# Patient Record
Sex: FEMALE | Race: BLACK OR AFRICAN AMERICAN | NOT HISPANIC OR LATINO | Employment: OTHER | URBAN - METROPOLITAN AREA
[De-identification: names, ages, dates, MRNs, and addresses within clinical notes are randomized per-mention and may not be internally consistent; named-entity substitution may affect disease eponyms.]

---

## 2017-01-11 ENCOUNTER — OFFICE VISIT (OUTPATIENT)
Dept: HEMATOLOGY/ONCOLOGY | Facility: CLINIC | Age: 72
End: 2017-01-11
Payer: MEDICARE

## 2017-01-11 VITALS
HEART RATE: 71 BPM | DIASTOLIC BLOOD PRESSURE: 72 MMHG | BODY MASS INDEX: 28.87 KG/M2 | HEIGHT: 65 IN | TEMPERATURE: 99 F | OXYGEN SATURATION: 98 % | SYSTOLIC BLOOD PRESSURE: 122 MMHG | WEIGHT: 173.31 LBS

## 2017-01-11 DIAGNOSIS — C50.912 MALIGNANT NEOPLASM OF LEFT FEMALE BREAST, UNSPECIFIED SITE OF BREAST: Primary | ICD-10-CM

## 2017-01-11 DIAGNOSIS — C78.00 MALIGNANT NEOPLASM METASTATIC TO LUNG, UNSPECIFIED LATERALITY: ICD-10-CM

## 2017-01-11 DIAGNOSIS — C77.1 MALIGNANT NEOPLASM METASTATIC TO INTRATHORACIC LYMPH NODE: ICD-10-CM

## 2017-01-11 DIAGNOSIS — C79.31 SECONDARY CANCER OF BRAIN: ICD-10-CM

## 2017-01-11 PROCEDURE — 1160F RVW MEDS BY RX/DR IN RCRD: CPT | Mod: S$GLB,,, | Performed by: INTERNAL MEDICINE

## 2017-01-11 PROCEDURE — 3078F DIAST BP <80 MM HG: CPT | Mod: S$GLB,,, | Performed by: INTERNAL MEDICINE

## 2017-01-11 PROCEDURE — 1159F MED LIST DOCD IN RCRD: CPT | Mod: S$GLB,,, | Performed by: INTERNAL MEDICINE

## 2017-01-11 PROCEDURE — 3074F SYST BP LT 130 MM HG: CPT | Mod: S$GLB,,, | Performed by: INTERNAL MEDICINE

## 2017-01-11 PROCEDURE — 99999 PR PBB SHADOW E&M-EST. PATIENT-LVL III: CPT | Mod: PBBFAC,,, | Performed by: INTERNAL MEDICINE

## 2017-01-11 PROCEDURE — 99499 UNLISTED E&M SERVICE: CPT | Mod: S$GLB,,, | Performed by: INTERNAL MEDICINE

## 2017-01-11 PROCEDURE — 1157F ADVNC CARE PLAN IN RCRD: CPT | Mod: S$GLB,,, | Performed by: INTERNAL MEDICINE

## 2017-01-11 PROCEDURE — 99214 OFFICE O/P EST MOD 30 MIN: CPT | Mod: S$GLB,,, | Performed by: INTERNAL MEDICINE

## 2017-01-11 PROCEDURE — 1125F AMNT PAIN NOTED PAIN PRSNT: CPT | Mod: S$GLB,,, | Performed by: INTERNAL MEDICINE

## 2017-01-11 RX ORDER — HYDROCODONE BITARTRATE AND ACETAMINOPHEN 5; 325 MG/1; MG/1
1 TABLET ORAL EVERY 4 HOURS PRN
Qty: 60 TABLET | Refills: 0 | Status: SHIPPED | OUTPATIENT
Start: 2017-01-11

## 2017-01-11 NOTE — PROGRESS NOTES
Reason for visit: Left breast infiltrating ductal carcinoma   Date of Diagnosis: 2003 and 2014  Date of diagnosis of metastatic disease from left breast carcinoma which was initially diagnosed and treated in 2014: Sep 16, 2016    HPI:   The patient is a 71-year-old  female who presents to the hematology oncology clinic to discuss further evaluation and management recommendations for history of left breast infiltrating ductal carcinoma [T1C] [ER negative/MN negative/HER-2 negative] now metastatic disease to bilateral lungs and brain.   Her malignancy was treated with left mastectomy.  Axillary lymph node evaluation was not done as the patient has had a prior left axillary lymph node dissection because of a prior malignancy in the same breast in 2003. The prior malignancy was a stage II infiltrating ductal carcinoma of the left breast and she also completed adjuvant endocrine therapy for this in 2008.  She declined adjuvant chemotherapy at that time. This most recent malignancy was felt to be a new primary as it was located in a different quadrant.  Her left breast infiltrating ductal carcinoma was diagnosed and treated in 2003/2004 with lumpectomy with left axillary lymph node dissection, adjuvant radiation therapy and adjuvant Femara.  She reports that she completed her adjuvant endocrine therapy in 2008.  The patient was previously followed in the outpatient hematology oncology clinic by Dr. Elke Nguyen and subsequently switched her care to me.  The patient refused adjuvant chemotherapy for her triple negative left breast carcinoma in 2014.  I have reviewed all of the patient's clinical history available in the medical record and have utilized this in my evaluation and management recommendations today. She has recovered well from her recent craniotomy. She has completed dexamethasone taper. She completed whole brain XRT on 1/5/17.   She denies any fevers, chills or night sweats.  She reports  loss of appetite/loss of taste. She denies unintentional weight loss.  She denies any melena, hematochezia, hematemesis, hemoptysis or hematuria.  She denies any abdominal pain.  She denies any bowel or urinary complaints.  She reports generalized weakness and fatigue.    PAST MEDICAL HISTORY:   1.  Infiltrating ductal carcinoma of the left breast [T1, N1, M0] in 2003/2004  2.  Hypertension  3.  Depression/anxiety  4.  Chronic hepatitis C s/p treatment with harvoni  5.  Chronic insomnia  6.  Coronary artery disease  7.  Osteopenia  8.  GERD    SURGICAL HISTORY:   1.  Hysterectomy  2.  Bilateral carpal tunnel release  3.  Left total mastectomy and left breast reconstruction with latissimus dorsi flap and tissue expander in oct 2014. She then had complete reconstruction done in March 2015.  4.  Right frontal craniotomy for resection of tumor on 9/7/16  5. Mediport placement    FAMILY HISTORY: She denies any immediate family members with cancer or bleeding/clotting disorders.    SOCIAL HISTORY: She has never smoked cigarettes.  She does not drink alcohol.  She has never used any recreational drugs.  She used to work as a  before she retired.  She is  and lives in [place].  She has one daughter living.    ALLERGIES: Reviewed on medication card.    MEDICATIONS: [Medcard has been reviewed and/or reconciled.]    REVIEW OF SYSTEMS:   GENERAL: [No fevers, chills or sweats. Reports generalized fatigue. Denies weight loss. Reports loss of appetite.]  HEENT: [No blurred vision, tinnitus, nasal discharge, sorethroat or dysphagia.]  HEART: [No chest pain, palpitations or shortness of breath.]    LUNGS: [No cough, hemoptysis or breathing problems.]  ABDOMEN: [No abdominal pain. Reports nausea and vomiting. Denies diarrhea, constipation or melena.]  GENITOURINARY: [No dysuria, bleeding or malodorous discharge.]  NEURO: [No headache, dizziness or vertigo.]  HEMATOLOGY: [No easy bruising, spontaneous bleeding or  blood clots in the past].  MUSCULOSKELETAL: [No arthralgias, myalgias or bone pains.]  SKIN: [No rashes or skin lesions.]  PSYCHIATRY: [She does have a history of depression and anxiety.]    PHYSICAL EXAMINATION:   VS: Reviewed on nurses notes.  APPEARANCE: The patient is a well-developed, well-nourished and well-groomed  female who appears in no acute distress. She was accompanied to this clinic visit by her .  HEENT: No scleral icterus. Both external auditory canals clear. No oral ulcers, lesions. Throat clear  HEAD: No sinus tenderness.  NECK: Supple. No palpable lymphadenopathy. Thyroid non-tender, no palpable masses  CHEST: Breath sounds clear bilaterally. No rales. No rhonchi. Unlabored respirations.  BREAST: Deferred today.  CARDIOVASCULAR: Normal S1, S2. Normal rate. Regular rhythm.  ABDOMEN: Bowel sounds normal. No tenderness. No abdominal distention. No hepatomegaly. No splenomegaly.  LYMPHATIC: No palpable supraclavicular, axillary nodes  EXTREMITIES: No clubbing, cyanosis, edema  SKIN: No lesions. No petechiae. No ecchymoses. No induration or nodules  NEUROLOGIC: No focal findings. Alert & Oriented x 3. Mood appropriate to affect    LABS:   Reviewed    IMAGING:  Reviewed    IMPRESSION:  1.  Metastatic left breast infiltrating ductal carcinoma. Initially [T1C] [ER negative/TN negative/HER-2 negative] in 2014  2.  History of stage II left breast infiltrating ductal carcinoma in 2003/2004    PLAN:  1.  I had a detailed discussion with the patient today with regard to her current clinical situation. Pathology report from brain mass evaluation is consistent with metastatic breast cancer.  2. I have recommended proceeding with palliative chemotherapy with weekly abraxane. Risks/benefits and common side effects were discussed in detail and she is agreeable to proceed with this. Informed consent taken.  3. I have also previously discussed the risks/benefits and importance of myrisk testing  for her history of breast cancer. She has previously stated that she will think about it and get back to me if she decides to get it done.   4.  She is scheduled to proceed with cycle 4 week 1 of abraxane today. However after a detailed discussion we have decided to defer by 1 week to give her a little bit more time to recover from her whole brain XRT. She will resume abraxane with dose reduction because of prior toxicity.    Followup in 1 week with labs for cycle 4 week 1 of abraxane. This was discussed in detail with the patient and her  and they expressed understanding.  She knows to call sooner for any additional questions or new problems.    Memo Pompa MD

## 2017-01-12 ENCOUNTER — TELEPHONE (OUTPATIENT)
Dept: NEUROSURGERY | Facility: CLINIC | Age: 72
End: 2017-01-12

## 2017-01-12 NOTE — TELEPHONE ENCOUNTER
----- Message from Yaima Mcintosh sent at 1/12/2017  9:56 AM CST -----  Contact: pt [phone] or [phone]  Pt is calling to speak with nurse regarding appt that is scheduled on 1/19.pls call

## 2017-01-12 NOTE — TELEPHONE ENCOUNTER
Spoke with pt. Pt requested to reschedule appointment to February. Pt is scheduled for MRI brain and follow up appointment with Dr. Hernandez on 2/23/2017. Appointment slips in the mail. Pt verbalized understanding.

## 2017-01-18 ENCOUNTER — OFFICE VISIT (OUTPATIENT)
Dept: HEMATOLOGY/ONCOLOGY | Facility: CLINIC | Age: 72
End: 2017-01-18
Payer: MEDICARE

## 2017-01-18 ENCOUNTER — INFUSION (OUTPATIENT)
Dept: INFUSION THERAPY | Facility: HOSPITAL | Age: 72
End: 2017-01-18
Attending: INTERNAL MEDICINE
Payer: MEDICARE

## 2017-01-18 VITALS
SYSTOLIC BLOOD PRESSURE: 142 MMHG | DIASTOLIC BLOOD PRESSURE: 72 MMHG | HEIGHT: 65 IN | OXYGEN SATURATION: 99 % | HEART RATE: 84 BPM | BODY MASS INDEX: 29.27 KG/M2 | WEIGHT: 175.69 LBS | TEMPERATURE: 98 F

## 2017-01-18 VITALS — HEIGHT: 65 IN | BODY MASS INDEX: 29.27 KG/M2 | WEIGHT: 175.69 LBS

## 2017-01-18 DIAGNOSIS — M25.511 CHRONIC RIGHT SHOULDER PAIN: ICD-10-CM

## 2017-01-18 DIAGNOSIS — C78.00 MALIGNANT NEOPLASM METASTATIC TO LUNG, UNSPECIFIED LATERALITY: ICD-10-CM

## 2017-01-18 DIAGNOSIS — C77.1 MALIGNANT NEOPLASM METASTATIC TO INTRATHORACIC LYMPH NODE: ICD-10-CM

## 2017-01-18 DIAGNOSIS — C50.912 MALIGNANT NEOPLASM OF LEFT FEMALE BREAST, UNSPECIFIED SITE OF BREAST: ICD-10-CM

## 2017-01-18 DIAGNOSIS — C78.00 MALIGNANT NEOPLASM METASTATIC TO LUNG, UNSPECIFIED LATERALITY: Primary | ICD-10-CM

## 2017-01-18 DIAGNOSIS — G89.29 CHRONIC RIGHT SHOULDER PAIN: ICD-10-CM

## 2017-01-18 DIAGNOSIS — C79.31 SECONDARY CANCER OF BRAIN: ICD-10-CM

## 2017-01-18 DIAGNOSIS — C50.912 MALIGNANT NEOPLASM OF LEFT FEMALE BREAST, UNSPECIFIED SITE OF BREAST: Primary | ICD-10-CM

## 2017-01-18 DIAGNOSIS — M54.2 NECK PAIN: ICD-10-CM

## 2017-01-18 PROCEDURE — 99215 OFFICE O/P EST HI 40 MIN: CPT | Mod: 25,S$GLB,, | Performed by: INTERNAL MEDICINE

## 2017-01-18 PROCEDURE — 99999 PR PBB SHADOW E&M-EST. PATIENT-LVL IV: CPT | Mod: PBBFAC,,, | Performed by: INTERNAL MEDICINE

## 2017-01-18 PROCEDURE — 96413 CHEMO IV INFUSION 1 HR: CPT | Mod: PO

## 2017-01-18 PROCEDURE — 3077F SYST BP >= 140 MM HG: CPT | Mod: S$GLB,,, | Performed by: INTERNAL MEDICINE

## 2017-01-18 PROCEDURE — 25000003 PHARM REV CODE 250: Mod: PO | Performed by: INTERNAL MEDICINE

## 2017-01-18 PROCEDURE — 99499 UNLISTED E&M SERVICE: CPT | Mod: S$GLB,,, | Performed by: INTERNAL MEDICINE

## 2017-01-18 PROCEDURE — 96375 TX/PRO/DX INJ NEW DRUG ADDON: CPT | Mod: PO

## 2017-01-18 PROCEDURE — 63600175 PHARM REV CODE 636 W HCPCS: Mod: PO | Performed by: INTERNAL MEDICINE

## 2017-01-18 PROCEDURE — 1125F AMNT PAIN NOTED PAIN PRSNT: CPT | Mod: S$GLB,,, | Performed by: INTERNAL MEDICINE

## 2017-01-18 PROCEDURE — 1157F ADVNC CARE PLAN IN RCRD: CPT | Mod: S$GLB,,, | Performed by: INTERNAL MEDICINE

## 2017-01-18 PROCEDURE — 3078F DIAST BP <80 MM HG: CPT | Mod: S$GLB,,, | Performed by: INTERNAL MEDICINE

## 2017-01-18 PROCEDURE — 1160F RVW MEDS BY RX/DR IN RCRD: CPT | Mod: S$GLB,,, | Performed by: INTERNAL MEDICINE

## 2017-01-18 PROCEDURE — 1159F MED LIST DOCD IN RCRD: CPT | Mod: S$GLB,,, | Performed by: INTERNAL MEDICINE

## 2017-01-18 RX ORDER — HEPARIN 100 UNIT/ML
500 SYRINGE INTRAVENOUS
Status: CANCELLED | OUTPATIENT
Start: 2017-01-18

## 2017-01-18 RX ORDER — SODIUM CHLORIDE 0.9 % (FLUSH) 0.9 %
10 SYRINGE (ML) INJECTION
Status: DISCONTINUED | OUTPATIENT
Start: 2017-01-18 | End: 2017-01-18 | Stop reason: HOSPADM

## 2017-01-18 RX ORDER — HEPARIN 100 UNIT/ML
500 SYRINGE INTRAVENOUS
Status: DISCONTINUED | OUTPATIENT
Start: 2017-01-18 | End: 2017-01-18 | Stop reason: HOSPADM

## 2017-01-18 RX ORDER — PALONOSETRON 0.05 MG/ML
0.25 INJECTION, SOLUTION INTRAVENOUS
Status: CANCELLED | OUTPATIENT
Start: 2017-01-18 | End: 2017-01-18

## 2017-01-18 RX ORDER — SODIUM CHLORIDE 0.9 % (FLUSH) 0.9 %
10 SYRINGE (ML) INJECTION
Status: CANCELLED | OUTPATIENT
Start: 2017-01-18

## 2017-01-18 RX ORDER — PALONOSETRON 0.05 MG/ML
0.25 INJECTION, SOLUTION INTRAVENOUS
Status: COMPLETED | OUTPATIENT
Start: 2017-01-18 | End: 2017-01-18

## 2017-01-18 RX ADMIN — PACLITAXEL 240 MG: 100 INJECTION, POWDER, LYOPHILIZED, FOR SUSPENSION INTRAVENOUS at 10:01

## 2017-01-18 RX ADMIN — HEPARIN 500 UNITS: 100 SYRINGE at 10:01

## 2017-01-18 RX ADMIN — SODIUM CHLORIDE, PRESERVATIVE FREE 10 ML: 5 INJECTION INTRAVENOUS at 10:01

## 2017-01-18 RX ADMIN — PALONOSETRON HYDROCHLORIDE 0.25 MG: 0.25 INJECTION INTRAVENOUS at 09:01

## 2017-01-18 NOTE — PLAN OF CARE
Problem: Patient Care Overview (Adult)  Goal: Plan of Care Review  Outcome: Ongoing (interventions implemented as appropriate)  Pt states she feels ok, just been having some shoulder and neck pain

## 2017-01-18 NOTE — PROGRESS NOTES
Reason for visit: Left breast infiltrating ductal carcinoma   Date of Diagnosis: 2003 and 2014  Date of diagnosis of metastatic disease from left breast carcinoma which was initially diagnosed and treated in 2014: Sep 16, 2016    HPI:   The patient is a 71-year-old  female who presents to the hematology oncology clinic to discuss further evaluation and management recommendations for history of left breast infiltrating ductal carcinoma [T1C] [ER negative/MD negative/HER-2 negative] now metastatic disease to bilateral lungs and brain.   Her malignancy was treated with left mastectomy.  Axillary lymph node evaluation was not done as the patient has had a prior left axillary lymph node dissection because of a prior malignancy in the same breast in 2003. The prior malignancy was a stage II infiltrating ductal carcinoma of the left breast and she also completed adjuvant endocrine therapy for this in 2008.  She declined adjuvant chemotherapy at that time. This most recent malignancy was felt to be a new primary as it was located in a different quadrant.  Her left breast infiltrating ductal carcinoma was diagnosed and treated in 2003/2004 with lumpectomy with left axillary lymph node dissection, adjuvant radiation therapy and adjuvant Femara.  She reports that she completed her adjuvant endocrine therapy in 2008.  The patient was previously followed in the outpatient hematology oncology clinic by Dr. Elke Nguyen and subsequently switched her care to me.  The patient refused adjuvant chemotherapy for her triple negative left breast carcinoma in 2014.  I have reviewed all of the patient's clinical history available in the medical record and have utilized this in my evaluation and management recommendations today. She has recovered well from her recent craniotomy. She has completed dexamethasone taper. She completed whole brain XRT on 1/5/17.   Today she reports that for the past 2-3 days she has been  having increasing pain in her neck base and upper back. She reports that this has been slowly getting worse since an accidental fall on her right shoulder 6 months ago but has become more bothersome in the past few days. She denies any fevers, chills or night sweats.  She reports loss of appetite/loss of taste. She denies unintentional weight loss.  She denies any melena, hematochezia, hematemesis, hemoptysis or hematuria.  She denies any abdominal pain.  She denies any bowel or urinary complaints.  She reports interval improvement in generalized weakness and fatigue.    PAST MEDICAL HISTORY:   1.  Infiltrating ductal carcinoma of the left breast [T1, N1, M0] in 2003/2004  2.  Hypertension  3.  Depression/anxiety  4.  Chronic hepatitis C s/p treatment with harvoni  5.  Chronic insomnia  6.  Coronary artery disease  7.  Osteopenia  8.  GERD    SURGICAL HISTORY:   1.  Hysterectomy  2.  Bilateral carpal tunnel release  3.  Left total mastectomy and left breast reconstruction with latissimus dorsi flap and tissue expander in oct 2014. She then had complete reconstruction done in March 2015.  4.  Right frontal craniotomy for resection of tumor on 9/7/16  5. Mediport placement    FAMILY HISTORY: She denies any immediate family members with cancer or bleeding/clotting disorders.    SOCIAL HISTORY: She has never smoked cigarettes.  She does not drink alcohol.  She has never used any recreational drugs.  She used to work as a  before she retired.  She is  and lives in [place].  She has one daughter living.    ALLERGIES: Reviewed on medication card.    MEDICATIONS: [Medcard has been reviewed and/or reconciled.]    REVIEW OF SYSTEMS:   GENERAL: [No fevers, chills or sweats. Reports generalized fatigue. Denies weight loss. Reports mild improvement in appetite.]  HEENT: [No blurred vision, tinnitus, nasal discharge, sorethroat or dysphagia.]  HEART: [No chest pain, palpitations or shortness of breath.]     LUNGS: [No cough, hemoptysis or breathing problems.]  ABDOMEN: [No abdominal pain. Reports nausea and vomiting. Denies diarrhea, constipation or melena.]  GENITOURINARY: [No dysuria, bleeding or malodorous discharge.]  NEURO: [No headache, dizziness or vertigo.]  HEMATOLOGY: [No easy bruising, spontaneous bleeding or blood clots in the past].  MUSCULOSKELETAL: [No arthralgias, myalgias or bone pains.]  SKIN: [No rashes or skin lesions.]  PSYCHIATRY: [She does have a history of depression and anxiety.]    PHYSICAL EXAMINATION:   VS: Reviewed on nurses notes.  APPEARANCE: The patient is a well-developed, well-nourished and well-groomed  female who appears in no acute distress. She was accompanied to this clinic visit by her .  HEENT: No scleral icterus. Both external auditory canals clear. No oral ulcers, lesions. Throat clear  HEAD: No sinus tenderness.  NECK: Supple. No palpable lymphadenopathy. Thyroid non-tender, no palpable masses. Mild TTP noted at the base of the neck and mid upper back.  CHEST: Breath sounds clear bilaterally. No rales. No rhonchi. Unlabored respirations.  BREAST: Deferred today.  CARDIOVASCULAR: Normal S1, S2. Normal rate. Regular rhythm.  ABDOMEN: Bowel sounds normal. No tenderness. No abdominal distention. No hepatomegaly. No splenomegaly.  LYMPHATIC: No palpable supraclavicular, axillary nodes  EXTREMITIES: No clubbing, cyanosis, edema. Restriction of movement of right shoulder.  SKIN: No lesions. No petechiae. No ecchymoses. No induration or nodules  NEUROLOGIC: No focal findings. Alert & Oriented x 3. Mood appropriate to affect    LABS:   Reviewed    IMAGING:  Reviewed    IMPRESSION:  1.  Metastatic left breast infiltrating ductal carcinoma. Initially [T1C] [ER negative/OH negative/HER-2 negative] in 2014  2.  History of stage II left breast infiltrating ductal carcinoma in 2003/2004  3.  Neck pain/Mid upper back pain  4.  Right shoulder pain    PLAN:  1.  I had  a detailed discussion with the patient today with regard to her current clinical situation. Pathology report from brain mass evaluation is consistent with metastatic breast cancer.  2. I have recommended proceeding with palliative chemotherapy with weekly abraxane. Risks/benefits and common side effects were discussed in detail and she is agreeable to proceed with this. Informed consent taken.  3. I have also previously discussed the risks/benefits and importance of myrisk testing for her history of breast cancer. She has previously stated that she will think about it and get back to me if she decides to get it done.   4.  She is scheduled to proceed with cycle 4 week 1 of abraxane today. She will resume abraxane with dose reduction because of prior toxicity.  5. We had a detailed discussion of her current reported symptoms with regard to neck pain/upper back pain and right shoulder pain. We discussed various options including evaluation in ER versus outpatient evaluation. She does not want to go to ER. She understands risks/benefits. She will be scheduled for outpatient MRI evaluation of cervical/thoracic spine and right shoulder to evaluate for any evidence of malignancy/fracture. This was offered at [facility] as on outpatient today but patient would like to schedule this for later this week. She understands risks/benefits. She knows to proceed to the ER if any worsening in the interim.    Followup in 1 week with labs for cycle 4 week 2 of abraxane. She knows to call sooner for any additional questions or new problems.    Memo Pompa MD

## 2017-01-18 NOTE — MR AVS SNAPSHOT
Patient Information     Patient Name Sex     Amalia Burgess Female 1945      Visit Information        Provider Department Dept Phone Center    2017 8:15 AM [facility] Chemo Infusion [facility] Chemotherapy Infusion [phone] [facility]      Patient Instructions     None      Your Current Medications Are     alendronate (FOSAMAX) 70 MG tablet    blood pressure test kit-large Kit    calcium carbonate (OS-OTONIEL) 500 mg calcium (1,250 mg) chewable tablet    citalopram (CELEXA) 20 MG tablet    fluticasone (FLONASE) 50 mcg/actuation nasal spray    hydrocodone-acetaminophen 5-325mg (NORCO) 5-325 mg per tablet    levetiracetam (KEPPRA) 1000 MG tablet    lisinopril 10 MG tablet    metoprolol succinate (TOPROL-XL) 100 MG 24 hr tablet    multivitamin (THERAGRAN) per tablet    ondansetron (ZOFRAN-ODT) 8 MG TbDL    prochlorperazine (COMPAZINE) 5 MG tablet      Facility-Administered Medications     heparin, porcine (PF) 100 unit/mL injection flush 500 Units    paclitaxel-protein bound (ABRAXANE) 125 mg/m2 = 240 mg in 48 mL infusion    palonosetron injection 0.25 mg    sodium chloride 0.9% flush 10 mL      Appointments for Next Year     2017 11:00 AM NON FASTING LAB (5 min.) [facility] LABORATORY, [facility]    Arrive at check-in approximately 15 minutes before your scheduled appointment time. Bring all outside medical records and imaging, along with a list of your current medications and insurance card.    (off [facility]) 2nd floor    2017  9:00 AM MRI CONTRAST (60 min.) [facility] MRI1    Arrive at check-in approximately 30 minutes before your scheduled appointment time. Bring all outside medical records and imaging, along with a list of your current medications and insurance card.  Magnetic Resonance Imaging- You will not be allowed to have the MRI if you have a cardiac pace-maker, implantable defibrillator, neurostimulator, biostimulator, or if you have anuerysm  clips in your brain (from many years ago).  WHAT YOUR DOCTOR NEEDS TO KNOW: You should tell your doctor if you have any metal in your body either from surgery or an injury. This includes metal pins, clips, plates, screws, schrapnel, ear implants, or permanent eyeliner. If female, you should tell your doctor if there is a chance you might be pregnant.  Please bring with you any papers your doctor has given you to sign.  Please bring with you a list of medications you are currently taking!  PLEASE REPORT TO THE MAGNETIC RESONANCE CENTER 30 MINUTES PRIOR TO YOUR SCHEDULED APPOINTMENT TIME, UNLESS YOU ARE A PEDIATRIC PATIENT THAT REQUIRES SEDATION OR ANESTHESIA, PLEASE ARRIVE 1 HOUR 30 MINUTES PRIOR TO YOUR APPOINTMENT AS THE [facility] ANESTHESIOLOGIST HAS TO DO THEIR ASSESSMENT.  WHAT IS THE PURPOSE OF THIS TEST: An MRI is a painless test that takes pictures of the inside of your body.  The pictures are taken in slices which show only a few layers of body tissue at a time.  Pictures taken this way can help your doctor find and see problems in the body more easily.  The MRI uses a magnetic field and radio waves instead of X-RAYS.  WHERE WILL YOUR TEST BE DONE AND BY WHOM? The test will be done in the MRI building. It will be done under the supervision of a radiologist and a radiologic technologist. The person giving you these instructions will tell you where to report for this test.  It usually takes 30 minutes to 1 hour.  HOW TO PREPARE FOR YOUR TEST: Wear comfortable clothing with no metal buttons or zippers. Do not wear jewelry including rings, earrings, necklaces, bracelets, or watches. Take all metal objects out of your hair. You will be asked to answer yes or no on a questionaire form regarding the possibility of any metal in your body.  Please bring someone with you if you are unable to answer the questions. A parent must accompany any child having an MRI. Tell your doctor if you are afraid of being in a closed or  cramped space. Your doctor will decide if you need medicine to help you relax. A small needle may be placed in a vein in your arm or hand so that you may receive a contrast solution. THIS IS NOT A DYE AND DOES NOT CONTAIN IODINE. No special preparation for any MRI exam EXCEPT for a Magnetic Resonance Cholangiopancreatoram which the patient should fast for 4 hours prior to the scheduled appointment time. You may take your usual medication with a small sip of water.  WHAT TO EXPECT DURING YOUR TEST: The radiologic technologist will check to make sure that you have removed all metal objects. You will be asked to lie down on the table of the MRI machine. To allow for the best quality exam, it is VERY IMPORTANT that you LIE VERY STILL during the exam. When the test starts the table will move into the open tunnel of the machine. Once the machine starts taking pictures, you will hear loud hammering or grinding noises. You may be able to wear headphones and hear music to block out the loud noises of the machine. If your test requires the use of contrast material, a small needle will be placed in a vein of your arm or hand. The contrast material will be pushed through the IV tube that has been placed in your arm or hand. The contrast material will be pushed through the IV tube that has been placed in your arm or hand. The skin around your IV may feel warm or cold.You should not have any changes in the way you feel. If you do, please tell the technologist.  WHAT TO EXPECT AFTER THE EXAM: If you were given medication to relax you, someone else will need to drive you home. DO NOT DRIVE OR USE HEAVY EQUIPMENT IF YOU TAKE MEDICATION THAT MAKES YOU DROWSY. You may resume normal daily activity if you did not receive sedation.  WHAT YOU NEED TO KNOW ABOUT YOUR APPOINTMENT: If you are unable to follow the above instructions or have questions or if you are delayed or unable to keep your scheduled appointment, please notify the MRI  Department where you appointment is scheduled.    1/20/2017 10:00 AM MRI T SPINE CONT (60 min.) [facility] MRI1    Arrive at check-in approximately 30 minutes before your scheduled appointment time. Bring all outside medical records and imaging, along with a list of your current medications and insurance card.  Magnetic Resonance Imaging- You will not be allowed to have the MRI if you have a cardiac pace-maker, implantable defibrillator, neurostimulator, biostimulator, or if you have anuerysm clips in your brain (from many years ago).  WHAT YOUR DOCTOR NEEDS TO KNOW: You should tell your doctor if you have any metal in your body either from surgery or an injury. This includes metal pins, clips, plates, screws, schrapnel, ear implants, or permanent eyeliner. If female, you should tell your doctor if there is a chance you might be pregnant. Please bring with you any papers your doctor has given you to sign. Please bring with you a list of medications you are currently taking!  PLEASE REPORT TO THE MAGNETIC RESONANCE CENTER 30 MINUTES PRIOR TO YOUR SCHEDULED APPOINTMENT TIME, UNLESS YOU ARE A PEDIATRIC PATIENT THAT REQUIRES SEDATION OR ANESTHESIA, PLEASE ARRIVE 1 HOUR 30 MINUTES PRIOR TO YOUR APPOINTMENT AS THE [facility] ANESTHESIOLOGIST HAS TO DO THEIR ASSESSMENT.  WHAT IS THE PURPOSE OF THIS TEST: An MRI is a painless test that takes pictures of the inside of your body. The pictures are taken in slices which show only a few layers of body tissue at a time. Pictures taken this way can help your doctor find and see problems in the body more easily. The MRI uses a magnetic field and radio waves instead of X-RAYS.  WHERE WILL YOUR TEST BE DONE AND BY WHOM? The test will be done in the MRI building. It will be done under the supervision of a radiologist and a radiologic technologist. The person giving you these instructions will tell you where to report for this test. It usually takes 30 minutes to 1  hour.  HOW TO PREPARE FOR YOUR TEST: Wear comfortable clothing with no metal buttons or zippers. Do not wear jewelry including rings, earrings, necklaces, bracelets, or watches. Take all metal objects out of your hair. You will be asked to answer yes or no on a questionaire form regarding the possibility of any metal in your body. Please bring someone with you if you are unable to answer the questions. A parent must accompany any child having an MRI. Tell your doctor if you are afraid of being in a closed or cramped space. Your doctor will decide if you need medicine to help you relax. A small needle may be placed in a vein in your arm or hand so that you may receive a contrast solution. THIS IS NOT A DYE AND DOES NOT CONTAIN IODINE. No special preparation for any MRI exam EXCEPT for a Magnetic Resonance Cholangiopancreatoram which the patient should fast for 4 hours prior to the scheduled appointment time. You may take your usual medication with a small sip of water.  WHAT TO EXPECT DURING YOUR TEST: The radiologic technologist will check to make sure that you have removed all metal objects. You will be asked to lie down on the table of the MRI machine. To allow for the best quality exam, it is VERY IMPORTANT that you LIE VERY STILL during the exam. When the test starts the table will move into the open tunnel of the machine.  Once the machine starts taking pictures, you will hear loud hammering or grinding noises. You may be able to wear headphones and hear music to block out the loud noises of the machine.  If your test requires the use of contrast material, a small needle will be placed in a vein of your arm or hand. The contrast material will be pushed through the IV tube that has been placed in your arm or hand.  The contrast material will be pushed through the IV tube that has been placed in your arm or hand. The skin around your IV may feel warm or cold.You should not have any changes in the way you feel. If  you do, please tell the technologist.  WHAT TO EXPECT AFTER THE EXAM: If you were given medication to relax you, someone else will need to drive you home. DO NOT DRIVE OR USE HEAVY EQUIPMENT IF YOU TAKE MEDICATION THAT MAKES YOU DROWSY. You may resume normal daily activity if you did not receive sedation.  WHAT YOU NEED TO KNOW ABOUT YOUR APPOINTMENT: If you are unable to follow the above instructions or have questions or if you are delayed or unable to keep your scheduled appointment, please notify the MRI Department where you appointment is scheduled.    1/20/2017 11:00 AM MRI C SPINE (60 min.) [facility] MRI1    Arrive at check-in approximately 30 minutes before your scheduled appointment time. Bring all outside medical records and imaging, along with a list of your current medications and insurance card.  Magnetic Resonance Imaging- You will not be allowed to have the MRI if you have a cardiac pace-maker, implantable defibrillator, neurostimulator, biostimulator, or if you have anuerysm clips in your brain (from many years ago).  WHAT YOUR DOCTOR NEEDS TO KNOW: You should tell your doctor if you have any metal in your body either from surgery or an injury.  This includes metal pins, clips, plates, screws, schrapnel, ear implants, or permanent eyeliner.  If female, you should tell your doctor if there is a chance you might be pregnant.  Please bring with you any papers your doctor has given you to sign.  Please bring with you a list of medications you are currently taking!   PLEASE REPORT TO THE MAGNETIC RESONANCE CENTER - 30 MINUTES PRIOR TO YOUR SCHEDULED APPOINTMENT TIME,  UNLESS YOU ARE A PEDIATRIC PATIENT THAT REQUIRES SEDATION OR ANESTHESIA, PLEASE ARRIVE 1 HOUR 30 MINUTES PRIOR TO YOUR APPOINTMENT AS THE [facility] ANESTHESIOLOGIST HAS TO DO THEIR ASSESSMENT.  WHAT IS THE PURPOSE OF THIS TEST: An MRI is a painless test that takes pictures of the inside of your body.  The pictures are  taken in slices which show only a few layers of body tissue at a time.  Pictures taken this way can help your doctor find and see problems in the body more easily.  The MRI uses a magnetic field and radio waves instead of X-RAYS.  WHERE WILL YOUR TEST BE DONE AND BY WHOM? The test will be done in the MRI building. It will be done under the supervision of a radiologist and a radiologic technologist. The person giving you these instructions will tell you where to report for this test.  It usually takes 30 minutes to 1 hour.  HOW TO PREPARE FOR YOUR TEST: Wear comfortable clothing with no metal buttons or zippers. Do not wear jewelry including rings, earrings, necklaces, bracelets, or watches. Take all metal objects out of your hair. You will be asked to answer yes or no on a questionaire form regarding the possibility of any metal in your body.  Please bring someone with you if you are unable to answer the questions.  A parent must accompany any child having an MRI.  Tell your doctor if you are afraid of being in a closed or cramped space. Your doctor will decide if you need medicine to help you relax. A small needle may be placed in a vein in your arm or hand so that you may receive a contrast solution.  THIS IS NOT A DYE AND DOES NOT CONTAIN IODINE. No special preparation for any MRI exam EXCEPT for a Magnetic Resonance Cholangiopancreatoram which the patient should fast for 4 hours prior to the scheduled appointment time.  You may take your usual medication with a small sip of water.  WHAT TO EXPECT DURING YOUR TEST: The radiologic technologist will check to make sure that you have removed all metal objects. You will be asked to lie down on the table of the MRI machine. To allow for the best quality exam, it is VERY IMPORTANT that you LIE VERY STILL during the exam.  When the test starts the table will move into the open tunnel of the machine.  Once the machine starts taking pictures, you will hear loud hammering  or grinding noises.  You may be able to wear headphones and hear music to block out the loud noises of the machine.  If your test requires the use of contrast material, a small needle will be placed in a vein of your arm or hand.  The contrast material will be pushed through the IV tube that has been placed in your arm or hand.  The contrast material will be pushed through the IV tube that has been placed in your arm or hand.  The skin around your IV may feel warm or cold.You should not have any changes in the way you feel.  If you do, please tell the technologist.   WHAT TO EXPECT AFTER THE EXAM: If you were given medication to relax you, someone else will need to drive you home. DO NOT DRIVE OR USE HEAVY EQUIPMENT IF YOU TAKE MEDICATION THAT MAKES YOU DROWSY. You may resume normal daily activity if you did not receive sedation.  WHAT YOU NEED TO KNOW ABOUT YOUR APPOINTMENT: If you are unable to follow the above instructions or have questions or if you are delayed or unable to keep your scheduled appointment, please notify the MRI Department where you appointment is scheduled.    1/25/2017  9:00 AM NON FASTING LAB (10 min.) [facility]    Arrive at check-in approximately 15 minutes before your scheduled appointment time. Bring all outside medical records and imaging, along with a list of your current medications and insurance card.    1/25/2017  9:40 AM ESTABLISHED PATIENT (20 min.) [facility] - Hematology Oncology Memo Pompa MD    Arrive at check-in approximately 15 minutes before your scheduled appointment time. Bring all outside medical records and imaging, along with a list of your current medications and insurance card.    (off [facility]) 1st Floor Appointments with Elizabeth Briseno - 2nd Floor    1/25/2017 10:15 AM INFUSION 060 MIN (60 min.) [facility] CHAIR 03 ONLH    Arrive at check-in approximately 15 minutes before your scheduled appointment  time. Bring all outside medical records and imaging, along with a list of your current medications and insurance card.    (off [facility]) 1st floor    2/23/2017  9:45 AM MRI BRAIN CONT (45 min.) [facility] MRI WIDE BORE    Magnetic Resonance Imaging- You will not be allowed to have the MRI if you have a cardiac pace-maker, implantable defibrillator, neurostimulator, biostimulator, or if you have anuerysm clips in your brain (from many years ago).  WHAT YOUR DOCTOR NEEDS TO KNOW: You should tell your doctor if you have any metal in your body either from surgery or an injury. This includes metal pins, clips, plates, screws, shrapnel, ear implants, or permanent eyeliner. If female, you should tell your doctor if there is a chance you might be pregnant. Please bring with you any papers your doctor has given you to sign. Please bring with you a list of medications you are currently taking!  PLEASE REPORT TO THE MAGNETIC RESONANCE CENTER 30 MINUTES PRIOR TO YOUR SCHEDULED APPOINTMENT TIME.  WHAT IS THE PURPOSE OF THIS TEST: An MRI is a painless test that takes pictures of the inside of your body. The pictures are taken in slices which show only a few layers of body tissue at a time. Pictures taken this way can help your doctor find and see problems in the body more easily. The MRI uses a magnetic field and radio waves instead of X-RAYS.  WHERE WILL YOUR TEST BE DONE AND BY WHOM? The test will be done in the MRI building. It will be done under the supervision of a radiologist and a radiologic technologist. The person giving you these instructions will tell you where to report for this test. It usually takes 15 minutes to one hour.  HOW TO PREPARE FOR YOUR TEST: Wear comfortable clothing with no metal buttons or zippers. Do not wear jewelry including rings, earrings, necklaces, bracelets, or watches. Take all metal objects out of your hair. You will be asked to answer yes or no on a questionaire form  regarding the possibility of any metal in your body. Please bring someone with you if you are unable to answer the questions. A parent must accompany any child having an MRI. Tell your doctor if you are afraid of being in a closed or cramped space. Your doctor will decide if you need medicine to help you relax.  A small needle may be placed in a vein in your arm or hand so that you may receive a contrast solution. THIS IS NOT A DYE AND DOES NOT CONTAIN IODINE. NO special preparation for any MRI exam EXCEPT for a Magnetic Resonance Cholangiopancreatogram which the patient should fast 4 hours prior to the scheduled  appointment time. You may take your usual medication with a small sip of water.  WHAT TO EXPECT DURING YOUR TEST: The radiologic technologist will check to make sure that you have removed all metal objects. You will be asked to lie down on the table of the MRI machine. To allow for the best quality exam, it is VERY IMPORTANT that you LIE VERY STILL during your exam. When the test starts, the table will move into the open tunnel of the machine. Once the machine starts taking pictures, you will hear loud hammering or grinding noises. You may be able to wear headphones and hear music to block out the loud noise of the machine. If your test requires the use of contrast material, a small needle will be placed in a vein of your arm or hand. The contrast material will be pushed through the IV tube that has been placed in your arm or hand. The skin around your IV may feel warm or cold. You should not have any changes in the way you feel. If you do, please tell the technologist.  WHAT TO EXPECT AFTER THE EXAM: If you were given medication to relax you, someone else will need to drive you home. DO NOT DRIVE OR USE HEAVY EQUIPMENT IF YOU TAKE MEDICATION THAT MAKES YOU DROWSY. You may resume normal daily activity if you did not receive sedation.  WHAT YOU NEED TO KNOW ABOUT YOUR APPOINTMENT: If you are unable to  "follow the above instructions or have questions or if you are delayed or unable to keep your scheduled appointment, please notify the following: In [place], call the Magnetic Resonance Center at [phone] In [place], call the Radiology Department at [phone]. On the [facility], call the Radiology Department at [phone]. On the [facility], call the Radiology Department at [phone].    [facility] Building    2/23/2017 11:15 AM POST-OP (15 min.) [facility] 7th Fl Jorge Hernandez MD    Arrive at check-in approximately 15 minutes before your scheduled appointment time. Bring all outside medical records and imaging, along with a list of your current medications and insurance card.    7th Floor    7/19/2017  9:15 AM ESTABLISHED PATIENT EXTENDED (15 min.) [facility] - Ophthalmology Shakir Keith OD    Arrive at check-in approximately 15 minutes before your scheduled appointment time. Bring all outside medical records and imaging, along with a list of your current medications and insurance card.    (off [facility]) 2nd floor         Default Flowsheet Data (last 24 hours)      Amb Complex Vitals Lc        01/18/17 0908 01/18/17 0831             Measurements    Weight 79.7 kg (175 lb 11.3 oz) 79.7 kg (175 lb 11.3 oz)       Height 5' 5" (1.651 m) 5' 5" (1.651 m)       BSA (Calculated - sq m) 1.91 sq meters 1.91 sq meters       BMI (Calculated) 29.3 29.3       BP  (!)  142/72       Temp  98.3 °F (36.8 °C)       Pulse  84       SpO2  99 %       Pain Assessment    Pain Score Eight Ten       Pain Loc NECK NECK               Allergies     Ambien [Zolpidem] Other (See Comments)    Other reaction(s): Hallucinations, wandering.  Complaints for feeling too sedated     Codeine Itching    Nortriptyline Other (See Comments)    Other reaction(s): Hallucinations    Prozac [Fluoxetine] Other (See Comments)    Other reaction(s): Hallucinations      Medications You Received from 01/17/2017 1036 to 01/18/2017 1036        " Date/Time Order Dose Route Action     01/18/2017 1003 paclitaxel-protein bound (ABRAXANE) 125 mg/m2 = 240 mg in 48 mL infusion 240 mg Intravenous New Bag     01/18/2017 0926 palonosetron injection 0.25 mg 0.25 mg Intravenous Given      Current Discharge Medication List     Cannot display discharge medications since this is not an admission.

## 2017-01-19 ENCOUNTER — TELEPHONE (OUTPATIENT)
Dept: RADIOLOGY | Facility: HOSPITAL | Age: 72
End: 2017-01-19

## 2017-01-20 ENCOUNTER — HOSPITAL ENCOUNTER (OUTPATIENT)
Dept: RADIOLOGY | Facility: HOSPITAL | Age: 72
Discharge: HOME | End: 2017-01-20
Attending: INTERNAL MEDICINE
Payer: MEDICARE

## 2017-01-20 DIAGNOSIS — G89.29 CHRONIC RIGHT SHOULDER PAIN: ICD-10-CM

## 2017-01-20 DIAGNOSIS — M25.511 CHRONIC RIGHT SHOULDER PAIN: ICD-10-CM

## 2017-01-20 DIAGNOSIS — G95.19: ICD-10-CM

## 2017-01-20 DIAGNOSIS — C79.31 SECONDARY CANCER OF BRAIN: ICD-10-CM

## 2017-01-20 DIAGNOSIS — C78.00 MALIGNANT NEOPLASM METASTATIC TO LUNG, UNSPECIFIED LATERALITY: ICD-10-CM

## 2017-01-20 DIAGNOSIS — C50.912 MALIGNANT NEOPLASM OF LEFT FEMALE BREAST, UNSPECIFIED SITE OF BREAST: Primary | ICD-10-CM

## 2017-01-20 DIAGNOSIS — M54.2 NECK PAIN: ICD-10-CM

## 2017-01-20 DIAGNOSIS — C77.1 MALIGNANT NEOPLASM METASTATIC TO INTRATHORACIC LYMPH NODE: ICD-10-CM

## 2017-01-20 DIAGNOSIS — C50.912 MALIGNANT NEOPLASM OF LEFT FEMALE BREAST, UNSPECIFIED SITE OF BREAST: ICD-10-CM

## 2017-01-20 PROCEDURE — 73223 MRI JOINT UPR EXTR W/O&W/DYE: CPT | Mod: TC,PO,RT,LT

## 2017-01-20 PROCEDURE — 72157 MRI CHEST SPINE W/O & W/DYE: CPT | Mod: 26,,, | Performed by: RADIOLOGY

## 2017-01-20 PROCEDURE — 72156 MRI NECK SPINE W/O & W/DYE: CPT | Mod: TC,PO

## 2017-01-20 PROCEDURE — 72157 MRI CHEST SPINE W/O & W/DYE: CPT | Mod: TC,PO

## 2017-01-20 PROCEDURE — 25500020 PHARM REV CODE 255: Mod: PO | Performed by: INTERNAL MEDICINE

## 2017-01-20 PROCEDURE — 72156 MRI NECK SPINE W/O & W/DYE: CPT | Mod: 26,,, | Performed by: RADIOLOGY

## 2017-01-20 PROCEDURE — 73223 MRI JOINT UPR EXTR W/O&W/DYE: CPT | Mod: 26,RT,, | Performed by: RADIOLOGY

## 2017-01-20 PROCEDURE — A9585 GADOBUTROL INJECTION: HCPCS | Mod: PO | Performed by: INTERNAL MEDICINE

## 2017-01-20 RX ORDER — DEXAMETHASONE 4 MG/1
8 TABLET ORAL EVERY 12 HOURS
Qty: 60 TABLET | Refills: 1 | Status: SHIPPED | OUTPATIENT
Start: 2017-01-20 | End: 2017-01-30

## 2017-01-20 RX ORDER — GADOBUTROL 604.72 MG/ML
7 INJECTION INTRAVENOUS
Status: COMPLETED | OUTPATIENT
Start: 2017-01-20 | End: 2017-01-20

## 2017-01-20 RX ADMIN — GADOBUTROL 7 ML: 604.72 INJECTION INTRAVENOUS at 12:01

## 2017-01-22 RX ORDER — ALENDRONATE SODIUM 70 MG/1
TABLET ORAL
Qty: 12 TABLET | Refills: 0 | Status: SHIPPED | OUTPATIENT
Start: 2017-01-22

## 2017-01-23 ENCOUNTER — OFFICE VISIT (OUTPATIENT)
Dept: HEMATOLOGY/ONCOLOGY | Facility: CLINIC | Age: 72
End: 2017-01-23
Payer: MEDICARE

## 2017-01-23 VITALS
HEART RATE: 97 BPM | WEIGHT: 172.19 LBS | HEIGHT: 65 IN | OXYGEN SATURATION: 99 % | DIASTOLIC BLOOD PRESSURE: 70 MMHG | TEMPERATURE: 98 F | SYSTOLIC BLOOD PRESSURE: 110 MMHG | BODY MASS INDEX: 28.69 KG/M2 | RESPIRATION RATE: 20 BRPM

## 2017-01-23 DIAGNOSIS — C50.912 MALIGNANT NEOPLASM OF LEFT FEMALE BREAST, UNSPECIFIED SITE OF BREAST: Primary | ICD-10-CM

## 2017-01-23 DIAGNOSIS — G95.19: ICD-10-CM

## 2017-01-23 DIAGNOSIS — C77.1 MALIGNANT NEOPLASM METASTATIC TO INTRATHORACIC LYMPH NODE: ICD-10-CM

## 2017-01-23 DIAGNOSIS — C78.00 MALIGNANT NEOPLASM METASTATIC TO LUNG, UNSPECIFIED LATERALITY: ICD-10-CM

## 2017-01-23 DIAGNOSIS — C79.49: ICD-10-CM

## 2017-01-23 DIAGNOSIS — C79.31 SECONDARY CANCER OF BRAIN: ICD-10-CM

## 2017-01-23 PROCEDURE — 99999 PR PBB SHADOW E&M-EST. PATIENT-LVL III: CPT | Mod: PBBFAC,,, | Performed by: INTERNAL MEDICINE

## 2017-01-23 PROCEDURE — 1160F RVW MEDS BY RX/DR IN RCRD: CPT | Mod: S$GLB,,, | Performed by: INTERNAL MEDICINE

## 2017-01-23 PROCEDURE — 1126F AMNT PAIN NOTED NONE PRSNT: CPT | Mod: S$GLB,,, | Performed by: INTERNAL MEDICINE

## 2017-01-23 PROCEDURE — 1159F MED LIST DOCD IN RCRD: CPT | Mod: S$GLB,,, | Performed by: INTERNAL MEDICINE

## 2017-01-23 PROCEDURE — 1157F ADVNC CARE PLAN IN RCRD: CPT | Mod: S$GLB,,, | Performed by: INTERNAL MEDICINE

## 2017-01-23 PROCEDURE — 3078F DIAST BP <80 MM HG: CPT | Mod: S$GLB,,, | Performed by: INTERNAL MEDICINE

## 2017-01-23 PROCEDURE — 99215 OFFICE O/P EST HI 40 MIN: CPT | Mod: S$GLB,,, | Performed by: INTERNAL MEDICINE

## 2017-01-23 PROCEDURE — 3074F SYST BP LT 130 MM HG: CPT | Mod: S$GLB,,, | Performed by: INTERNAL MEDICINE

## 2017-01-23 PROCEDURE — 99499 UNLISTED E&M SERVICE: CPT | Mod: S$GLB,,, | Performed by: INTERNAL MEDICINE

## 2017-01-23 NOTE — PROGRESS NOTES
Reason for visit: Left breast infiltrating ductal carcinoma   Date of Diagnosis: 2003 and 2014  Date of diagnosis of metastatic disease from left breast carcinoma which was initially diagnosed and treated in 2014: Sep 16, 2016    HPI:   The patient is a 72-year-old  female who presents to the hematology oncology clinic to discuss further evaluation and management recommendations for history of left breast infiltrating ductal carcinoma [T1C] [ER negative/ID negative/HER-2 negative] now metastatic disease to bilateral lungs and brain/CNS.   Her malignancy was treated with left mastectomy.  Axillary lymph node evaluation was not done as the patient has had a prior left axillary lymph node dissection because of a prior malignancy in the same breast in 2003. The prior malignancy was a stage II infiltrating ductal carcinoma of the left breast and she also completed adjuvant endocrine therapy for this in 2008.  She declined adjuvant chemotherapy at that time. This most recent malignancy was felt to be a new primary as it was located in a different quadrant.  Her left breast infiltrating ductal carcinoma was diagnosed and treated in 2003/2004 with lumpectomy with left axillary lymph node dissection, adjuvant radiation therapy and adjuvant Femara.  She reports that she completed her adjuvant endocrine therapy in 2008.  The patient was previously followed in the outpatient hematology oncology clinic by Dr. Elke Nguyen and subsequently switched her care to me.  The patient refused adjuvant chemotherapy for her triple negative left breast carcinoma in 2014.  I have reviewed all of the patient's clinical history available in the medical record and have utilized this in my evaluation and management recommendations today. She has recovered well from her recent craniotomy. She has completed dexamethasone taper. She completed whole brain XRT on 1/5/17.   Today she reports that for the past week she has been  having increasing pain in her neck base and upper back. She reports that this has been slowly getting worse since an accidental fall on her right shoulder 6 months ago but had become more bothersome in the past few days. She denies any fevers, chills or night sweats.  She reports loss of appetite/loss of taste. She denies unintentional weight loss.  She denies any melena, hematochezia, hematemesis, hemoptysis or hematuria.  She denies any abdominal pain.  She denies any bowel or urinary complaints.  She reports interval improvement in generalized weakness and fatigue.  She did get MRIs of the cervical spine and thoracic spine on Jan 20, 2017.  I had started on dexamethasone 8 mg by mouth twice a day and she reports improvement in her neck pain after this.    PAST MEDICAL HISTORY:   1.  Infiltrating ductal carcinoma of the left breast [T1, N1, M0] in 2003/2004  2.  Hypertension  3.  Depression/anxiety  4.  Chronic hepatitis C s/p treatment with harvoni  5.  Chronic insomnia  6.  Coronary artery disease  7.  Osteopenia  8.  GERD    SURGICAL HISTORY:   1.  Hysterectomy  2.  Bilateral carpal tunnel release  3.  Left total mastectomy and left breast reconstruction with latissimus dorsi flap and tissue expander in oct 2014. She then had complete reconstruction done in March 2015.  4.  Right frontal craniotomy for resection of tumor on 9/7/16  5. Mediport placement    FAMILY HISTORY: She denies any immediate family members with cancer or bleeding/clotting disorders.    SOCIAL HISTORY: She has never smoked cigarettes.  She does not drink alcohol.  She has never used any recreational drugs.  She used to work as a  before she retired.  She is  and lives in [place].  She has one daughter living.    ALLERGIES: Reviewed on medication card.    MEDICATIONS: [Medcard has been reviewed and/or reconciled.]    REVIEW OF SYSTEMS:   GENERAL: [No fevers, chills or sweats. Reports generalized fatigue. Denies weight  loss. Reports mild improvement in appetite.]  HEENT: [No blurred vision, tinnitus, nasal discharge, sorethroat or dysphagia.]  HEART: [No chest pain, palpitations or shortness of breath.]    LUNGS: [No cough, hemoptysis or breathing problems.]  ABDOMEN: [No abdominal pain. Reports nausea and vomiting. Denies diarrhea, constipation or melena.]  GENITOURINARY: [No dysuria, bleeding or malodorous discharge.]  NEURO: [No headache, dizziness or vertigo.]  HEMATOLOGY: [No easy bruising, spontaneous bleeding or blood clots in the past].  MUSCULOSKELETAL: [No arthralgias, myalgias or bone pains.]  SKIN: [No rashes or skin lesions.]  PSYCHIATRY: [She does have a history of depression and anxiety.]    PHYSICAL EXAMINATION:   VS: Reviewed on nurses notes.  APPEARANCE: The patient is a well-developed, well-nourished and well-groomed  female who appears in no acute distress. She was accompanied to this clinic visit by her daughter.  The remainder of the patient's physical exam was deferred today.    LABS:   Reviewed    IMAGING:  Reviewed    IMPRESSION:  1.  Metastatic left breast infiltrating ductal carcinoma. Initially [T1C] [ER negative/HI negative/HER-2 negative] in 2014.  2.  History of stage II left breast infiltrating ductal carcinoma in 2003/2004  3.  Neck pain/Mid upper back pain due to CNS involvement by metastatic malignancy  4.  Right shoulder pain    PLAN:  1.  I had a detailed discussion with the patient today with regard to her current clinical situation.  I have discussed the results of the MRI of the thoracic spine and cervical spine in detail with the patient and her daughter today.  Findings are consistent with CNS involvement by metastatic malignancy.  2.  She understands that she has a potentially treatable but not curable malignancy.  She understands the poor prognosis of her malignancy at this time.  3.  We had a detailed discussion about all available options for further management of her  metastatic malignancy in light of her new clinical/imaging findings.  We discussed about additional treatment options including potentially additional palliative radiation therapy and Ommaya placement/intrathecal chemotherapy for leptomeningeal/CNS involvement.  We discussed about the risks/benefits/possible side effects and also about the possible poor likelihood of response to this treatment.  I have also discussed the option of transitioning to palliative care/comfort care with hospice.  4.  After a detailed and extensive discussion the patient would like to proceed with palliative care/comfort care with transition to hospice.  Hospice philosophy was discussed in detail and I have discussed that this is a very reasonable choice in her case.  I have recommended that she continue on dexamethasone 8 mg by mouth twice a day for 2 weeks and then continue slow taper over the next few weeks to 4 mg by mouth once daily and stay on that dose.  She understands that all of these recommendations with regard to her dexamethasone are subject to change and will depend on her overall clinical situation as the next few weeks progress.   5.  Considering the above I will arrange informational visit from hospice and the patient will enrolled with hospice after this if agreeable.    She knows to call sooner for any additional questions or new problems.    I spent greater than 40 minutes face-to-face with the patient and her daughter discussing and reviewing all of the above information in detail with greater than 50% of this time spent in counseling.    Memo Pompa MD

## 2017-02-25 DIAGNOSIS — I10 ESSENTIAL HYPERTENSION: ICD-10-CM

## 2017-02-26 RX ORDER — METOPROLOL SUCCINATE 100 MG/1
TABLET, EXTENDED RELEASE ORAL
Qty: 90 TABLET | Refills: 0 | Status: SHIPPED | OUTPATIENT
Start: 2017-02-26

## 2017-03-09 ENCOUNTER — TELEPHONE (OUTPATIENT)
Dept: INTERNAL MEDICINE | Facility: CLINIC | Age: 72
End: 2017-03-09

## 2017-03-09 NOTE — TELEPHONE ENCOUNTER
Pt daughter  Is requesting for patient to be worked in today. Pt daughter was advised that there are no available appointment for today but was scheduled for Tuesday at 9:20

## 2017-03-09 NOTE — TELEPHONE ENCOUNTER
----- Message from Demetri Gonzáles sent at 3/9/2017 12:57 PM CST -----  Contact: pt daughter Lisa [phone]  States she is calling to see if pt can be worked in for pt having weakness on left side and wants to be seen today and can be reached at [phone]//una/otilio

## 2017-03-14 ENCOUNTER — OFFICE VISIT (OUTPATIENT)
Dept: INTERNAL MEDICINE | Facility: CLINIC | Age: 72
End: 2017-03-14
Payer: MEDICARE

## 2017-03-14 ENCOUNTER — LAB VISIT (OUTPATIENT)
Dept: LAB | Facility: HOSPITAL | Age: 72
End: 2017-03-14
Attending: FAMILY MEDICINE
Payer: MEDICARE

## 2017-03-14 VITALS
TEMPERATURE: 98 F | DIASTOLIC BLOOD PRESSURE: 60 MMHG | HEIGHT: 65 IN | HEART RATE: 76 BPM | OXYGEN SATURATION: 97 % | SYSTOLIC BLOOD PRESSURE: 114 MMHG

## 2017-03-14 DIAGNOSIS — Z86.19 HISTORY OF HEPATITIS C: Chronic | ICD-10-CM

## 2017-03-14 DIAGNOSIS — F41.8 ANXIETY ASSOCIATED WITH DEPRESSION: ICD-10-CM

## 2017-03-14 DIAGNOSIS — G93.6 VASOGENIC CEREBRAL EDEMA: ICD-10-CM

## 2017-03-14 DIAGNOSIS — R53.1 LEFT-SIDED WEAKNESS: Primary | ICD-10-CM

## 2017-03-14 DIAGNOSIS — Z95.828 PORT CATHETER IN PLACE: ICD-10-CM

## 2017-03-14 DIAGNOSIS — C79.31 SECONDARY CANCER OF BRAIN: ICD-10-CM

## 2017-03-14 DIAGNOSIS — I27.20 PULMONARY HYPERTENSION: ICD-10-CM

## 2017-03-14 DIAGNOSIS — M19.011 PRIMARY OSTEOARTHRITIS OF RIGHT SHOULDER: ICD-10-CM

## 2017-03-14 DIAGNOSIS — C78.00 MALIGNANT NEOPLASM METASTATIC TO LUNG, UNSPECIFIED LATERALITY: ICD-10-CM

## 2017-03-14 DIAGNOSIS — C50.912 MALIGNANT NEOPLASM OF LEFT FEMALE BREAST, UNSPECIFIED SITE OF BREAST: ICD-10-CM

## 2017-03-14 DIAGNOSIS — M85.80 OSTEOPENIA: ICD-10-CM

## 2017-03-14 DIAGNOSIS — C79.49: ICD-10-CM

## 2017-03-14 DIAGNOSIS — M47.22 OSTEOARTHRITIS OF SPINE WITH RADICULOPATHY, CERVICAL REGION: ICD-10-CM

## 2017-03-14 DIAGNOSIS — R53.82 CHRONIC FATIGUE: ICD-10-CM

## 2017-03-14 DIAGNOSIS — I10 ESSENTIAL HYPERTENSION: Chronic | ICD-10-CM

## 2017-03-14 DIAGNOSIS — F33.0 DEPRESSION, MAJOR, RECURRENT, MILD: ICD-10-CM

## 2017-03-14 DIAGNOSIS — I70.0 CALCIFICATION OF AORTA: ICD-10-CM

## 2017-03-14 DIAGNOSIS — Z90.12 HISTORY OF LEFT MASTECTOMY: ICD-10-CM

## 2017-03-14 DIAGNOSIS — M12.811 ROTATOR CUFF TEAR ARTHROPATHY, RIGHT: ICD-10-CM

## 2017-03-14 DIAGNOSIS — Z85.3 HISTORY OF BREAST CANCER IN FEMALE: Chronic | ICD-10-CM

## 2017-03-14 DIAGNOSIS — M75.101 ROTATOR CUFF TEAR ARTHROPATHY, RIGHT: ICD-10-CM

## 2017-03-14 DIAGNOSIS — I25.10 CORONARY ARTERY DISEASE INVOLVING NATIVE CORONARY ARTERY OF NATIVE HEART WITHOUT ANGINA PECTORIS: Chronic | ICD-10-CM

## 2017-03-14 LAB
CREAT SERPL-MCNC: 0.9 MG/DL
EST. GFR  (AFRICAN AMERICAN): >60 ML/MIN/1.73 M^2
EST. GFR  (NON AFRICAN AMERICAN): >60 ML/MIN/1.73 M^2

## 2017-03-14 PROCEDURE — 1160F RVW MEDS BY RX/DR IN RCRD: CPT | Mod: S$GLB,,, | Performed by: FAMILY MEDICINE

## 2017-03-14 PROCEDURE — 3074F SYST BP LT 130 MM HG: CPT | Mod: S$GLB,,, | Performed by: FAMILY MEDICINE

## 2017-03-14 PROCEDURE — 3078F DIAST BP <80 MM HG: CPT | Mod: S$GLB,,, | Performed by: FAMILY MEDICINE

## 2017-03-14 PROCEDURE — 82565 ASSAY OF CREATININE: CPT

## 2017-03-14 PROCEDURE — 99499 UNLISTED E&M SERVICE: CPT | Mod: S$GLB,,, | Performed by: FAMILY MEDICINE

## 2017-03-14 PROCEDURE — 99999 PR PBB SHADOW E&M-EST. PATIENT-LVL III: CPT | Mod: PBBFAC,,, | Performed by: FAMILY MEDICINE

## 2017-03-14 PROCEDURE — 1159F MED LIST DOCD IN RCRD: CPT | Mod: S$GLB,,, | Performed by: FAMILY MEDICINE

## 2017-03-14 PROCEDURE — 99214 OFFICE O/P EST MOD 30 MIN: CPT | Mod: GZ,GW,S$GLB, | Performed by: FAMILY MEDICINE

## 2017-03-14 PROCEDURE — 1157F ADVNC CARE PLAN IN RCRD: CPT | Mod: S$GLB,,, | Performed by: FAMILY MEDICINE

## 2017-03-14 PROCEDURE — 36415 COLL VENOUS BLD VENIPUNCTURE: CPT | Mod: PO

## 2017-03-14 NOTE — MR AVS SNAPSHOT
[facility] Internal Medicine  [address] [place] [address]  Phone: [phone]  Fax: [phone]                  Amalia Burgess   3/14/2017 9:20 AM   Office Visit    Description:  Female : 1945   Provider:  Sally Reyna MD   Department:  [facility] - Internal Medicine           Reason for Visit     Fatigue           Diagnoses this Visit        Comments    Osteoarthritis of spine with radiculopathy, cervical region    -  Primary     Primary osteoarthritis of right shoulder         Rotator cuff tear arthropathy, right         Malignant neoplasm of left female breast, unspecified site of breast         Secondary adenocarcinoma of spinal cord         Secondary cancer of brain         Malignant neoplasm metastatic to lung, unspecified laterality         Essential hypertension         Chronic fatigue         Anxiety associated with depression         Port catheter in place         History of left mastectomy         Osteopenia         History of hepatitis C         Coronary artery disease involving native coronary artery of native heart without angina pectoris         History of breast cancer in female         Left-sided weakness                To Do List           Future Appointments        Provider Department Dept Phone    3/14/2017 11:55 AM LABORATORY, [facility] [phone]    3/18/2017 8:45 AM [facility] [phone]    2017 9:20 AM Sally Reyna MD [facility] [phone]    2017 9:15 AM Shakir Keith OD [facility] - Ophthalmology [phone]      Goals (5 Years of Data)     None      Follow-Up and Disposition     Return in about 4 weeks (around 2017), or if symptoms worsen or fail to improve.      [facility]     [facility] Care [facility] -  Assistance  Registered nurses in the [facility] provide clinical advisement, health education, appointment booking, and other advisory  "services.  Call for this free service at [phone].             Medications           Message regarding Medications     Verify the changes and/or additions to your medication regime listed below are the same as discussed with your clinician today.  If any of these changes or additions are incorrect, please notify your healthcare provider.             Verify that the below list of medications is an accurate representation of the medications you are currently taking.  If none reported, the list may be blank. If incorrect, please contact your healthcare provider. Carry this list with you in case of emergency.           Current Medications     alendronate (FOSAMAX) 70 MG tablet TAKE 1 TABLET BY MOUTH EVERY 7 DAYS    blood pressure test kit-large Kit To monitor blood pressure daily    calcium carbonate (OS-OTONIEL) 500 mg calcium (1,250 mg) chewable tablet Take 1 tablet by mouth once daily.    citalopram (CELEXA) 20 MG tablet TAKE 1 TABLET BY MOUTH DAILY    fluticasone (FLONASE) 50 mcg/actuation nasal spray 1 spray by Each Nare route once daily.    hydrocodone-acetaminophen 5-325mg (NORCO) 5-325 mg per tablet Take 1 tablet by mouth every 4 (four) hours as needed.    levetiracetam (KEPPRA) 1000 MG tablet Take 1 tablet (1,000 mg total) by mouth 2 (two) times daily.    lisinopril 10 MG tablet Take 1 tablet (10 mg total) by mouth once daily.    metoprolol succinate (TOPROL-XL) 100 MG 24 hr tablet TAKE 1 TABLET BY MOUTH EVERY DAY    multivitamin (THERAGRAN) per tablet Take 1 tablet by mouth every morning.     ondansetron (ZOFRAN-ODT) 8 MG TbDL Take 1 tablet (8 mg total) by mouth every 12 (twelve) hours as needed.    prochlorperazine (COMPAZINE) 5 MG tablet Take 1 tablet (5 mg total) by mouth every 6 (six) hours as needed for Nausea.           Clinical Reference Information           Your Vitals Were     BP Pulse Temp Height Last Period SpO2    114/60 76 98.4 °F (36.9 °C) (Tympanic) 5' 5" (1.651 m) (LMP Unknown) 97%    "   Blood Pressure          Most Recent Value    BP  114/60      Allergies as of 3/14/2017     Ambien [Zolpidem]    Codeine    Nortriptyline    Prozac [Fluoxetine]      Immunizations Administered on Date of Encounter - 3/14/2017     None      Orders Placed During Today's Visit      Normal Orders This Visit    Ambulatory Referral to Physical/Occupational Therapy     Future Labs/Procedures Expected by Expires    Creatinine, serum  3/14/2017 5/13/2018    MRI Brain W WO Contrast  3/14/2017 3/14/2018      Language Assistance Services     ATTENTION: Language assistance services are available, free of charge. Please call [phone].      ATENCIÓN: Si habla español, tiene a grayson disposición servicios gratuitos de asistencia lingüística. Llame al [phone].     CHÚ Ý: N?u b?n nói Ti?ng Vi?t, có các d?ch v? h? tr? ngôn ng? mi?n phí dành cho b?n. G?i s? [phone].         [facility] - Internal Medicine complies with applicable Federal civil rights laws and does not discriminate on the basis of race, color, national origin, age, disability, or sex.

## 2017-03-14 NOTE — PROGRESS NOTES
Subjective:       Patient ID: Amalia Burgess is a 72 y.o. female.    Chief Complaint: Fatigue    HPI Comments: 72-year-old female patient with Patient Active Problem List:     Essential hypertension     Osteopenia     History of 2 breast cancers- 10 years apart left breast       Calcification of aorta     Coronary artery disease involving native coronary artery of native heart without angina pectoris     Refraction disorder     History of hepatitis C     Insomnia     Vitamin D insufficiency     Vasogenic cerebral edema     Pulmonary hypertension     History of left mastectomy     Secondary lung cancer     Secondary cancer of brain     Malignant neoplasm of left female breast     Malignant neoplasm metastatic to intrathoracic lymph node     Power Port in place     Depression, major, recurrent, mild     Generalized anxiety disorder     Nausea & vomiting     Edema, spinal cord     Secondary adenocarcinoma of spinal cord  Accompanied by her family members here with complaint of left-sided weakness, for the past few weeks, occasionally has been trouble with walking, feels that her knee has been giving out.   Patient continues to have significant neck pain, radiating to bilateral shoulders, has been having right shoulder pain.  Patient currently with hospice, taking pain medications every 4 hours this week, reports pain as 8/10, especially in the neck, radiating to the shoulders.   Reports minimal low back pain, no injury or trauma reported, no recent falls reported.   Anxiety and depression has been stable  Patient has canceled neurology appointment in February, as she decided to get into hospice in January after discussing with hematologist/oncologist, secondary to metastasis of breast cancer, to the lungs, brain and spinal cord.   Patient has been taking her medications regularly  Denies of any chest pain or shortness of breath  Requesting handicap sticker paperwork to be renewed  Reports fatigue.   "      Fatigue   Associated symptoms include arthralgias, fatigue, myalgias, neck pain and numbness. Pertinent negatives include no abdominal pain, chest pain, fever, headaches, nausea, rash, vomiting or weakness.     Review of Systems   Constitutional: Positive for fatigue. Negative for fever.   Eyes: Negative for visual disturbance.   Respiratory: Negative for shortness of breath.    Cardiovascular: Negative for chest pain and leg swelling.   Gastrointestinal: Negative for abdominal pain, nausea and vomiting.   Musculoskeletal: Positive for arthralgias, back pain, gait problem, myalgias and neck pain. Negative for neck stiffness.   Skin: Negative for rash.   Neurological: Positive for tremors and numbness. Negative for syncope, speech difficulty, weakness, light-headedness and headaches.   Psychiatric/Behavioral: Negative for behavioral problems, sleep disturbance and suicidal ideas. The patient is nervous/anxious.          /60  Pulse 76  Temp 98.4 °F (36.9 °C) (Tympanic)   Ht 5' 5" (1.651 m)  LMP  (LMP Unknown)  SpO2 97%  Objective:      Physical Exam   Constitutional: She is oriented to person, place, and time. She appears well-developed and well-nourished.   HENT:   Head: Normocephalic and atraumatic.   Mouth/Throat: Oropharynx is clear and moist.   Cardiovascular: Normal rate, regular rhythm and normal heart sounds.    No murmur heard.  Pulmonary/Chest: Effort normal and breath sounds normal. She has no wheezes.   Abdominal: Soft. Bowel sounds are normal. There is no tenderness.   Musculoskeletal: She exhibits tenderness. She exhibits no edema.   Positive for tenderness in the paraspinal cervical muscles, and bilateral shoulders, more in the right side  Hand  1+ in the right hand, 2+ in the left hand.   Strength 5/5 noted in bilateral lower extremities  Patient in wheelchair     Neurological: She is alert and oriented to person, place, and time.   Positive for tremor to bilateral " hands  Neurological exam stable today   Skin: Skin is warm and dry. No rash noted.   Psychiatric: She has a normal mood and affect.         Assessment:       1. Left-sided weakness    2. Osteoarthritis of spine with radiculopathy, cervical region    3. Primary osteoarthritis of right shoulder    4. Rotator cuff tear arthropathy, right    5. Malignant neoplasm of left female breast, unspecified site of breast    6. Secondary adenocarcinoma of spinal cord    7. Secondary cancer of brain    8. Malignant neoplasm metastatic to lung, unspecified laterality    9. Essential hypertension    10. Chronic fatigue    11. Power Port in place    12. Anxiety associated with depression    13. History of left mastectomy    14. Osteopenia    15. History of hepatitis C    16. Coronary artery disease involving native coronary artery of native heart without angina pectoris    17. History of 2 breast cancers- 10 years apart left breast      18. Depression, major, recurrent, mild    19. Pulmonary hypertension    20. Calcification of aorta    21. Vasogenic cerebral edema        Plan:   Left-sided weakness  -     Ambulatory Referral to Physical/Occupational Therapy  -     MRI Brain W WO Contrast; Future; Expected date: 3/14/17  Osteoarthritis of spine with radiculopathy, cervical region  -     Ambulatory Referral to Physical/Occupational Therapy  -     Creatinine, serum; Future; Expected date: 3/14/17  Primary osteoarthritis of right shoulder  -     Ambulatory Referral to Physical/Occupational Therapy  Rotator cuff tear arthropathy, right  -     Ambulatory Referral to Physical/Occupational Therapy  Reviewed previous MRI of the brain, cervical spine and the right shoulder, showing significant spread of the cancer, metastasis to multiple areas.   Patient also has partial tear to the right shoulder.   Currently on pain medications, with hospice.   Will schedule physical therapy at [facility] location as requested by patient for symptomatic  relief  Secondary to left-sided weakness which is new for the past few weeks, will get MRI of the brain with and without contrast.     Malignant neoplasm of left female breast, unspecified site of breast  Secondary adenocarcinoma of spinal cord  Secondary cancer of brain  Malignant neoplasm metastatic to lung, unspecified laterality  History of 2 breast cancers- 10 years apart left breast    Power Port in place  Chronic fatigue  History of left mastectomy  Vasogenic cerebral edema  As per hematology/oncology, currently with hospice  Encouraged to eat protein rich diet      Essential hypertension-blood pressure stable today currently taking lisinopril 10 mg and metoprolol 100 mg daily    Anxiety associated with depression  Depression, major, recurrent, mild  -currently on Celexa 20 mg daily    Osteopenia-stable on Fosamax 70 mg and calcium with vitamin D supplements    History of hepatitis C    Coronary artery disease involving native coronary artery of native heart without angina pectoris-  Pulmonary hypertension  Calcification of aorta  Stable followed by cardiology

## 2017-03-17 ENCOUNTER — TELEPHONE (OUTPATIENT)
Dept: RADIOLOGY | Facility: HOSPITAL | Age: 72
End: 2017-03-17

## 2017-03-22 ENCOUNTER — PATIENT MESSAGE (OUTPATIENT)
Dept: HEMATOLOGY/ONCOLOGY | Facility: CLINIC | Age: 72
End: 2017-03-22

## 2017-04-04 DIAGNOSIS — F32.9 MAJOR DEPRESSION, CHRONIC: ICD-10-CM

## 2017-04-04 RX ORDER — CITALOPRAM 20 MG/1
TABLET, FILM COATED ORAL
Qty: 90 TABLET | Refills: 0 | Status: SHIPPED | OUTPATIENT
Start: 2017-04-04